# Patient Record
Sex: MALE | Race: OTHER | NOT HISPANIC OR LATINO | URBAN - METROPOLITAN AREA
[De-identification: names, ages, dates, MRNs, and addresses within clinical notes are randomized per-mention and may not be internally consistent; named-entity substitution may affect disease eponyms.]

---

## 2021-12-15 ENCOUNTER — EMERGENCY (EMERGENCY)
Facility: HOSPITAL | Age: 36
LOS: 1 days | Discharge: HOME | End: 2021-12-15
Attending: EMERGENCY MEDICINE | Admitting: EMERGENCY MEDICINE
Payer: SELF-PAY

## 2021-12-15 VITALS
WEIGHT: 149.91 LBS | RESPIRATION RATE: 18 BRPM | HEIGHT: 68 IN | SYSTOLIC BLOOD PRESSURE: 158 MMHG | DIASTOLIC BLOOD PRESSURE: 100 MMHG | TEMPERATURE: 98 F | HEART RATE: 102 BPM | OXYGEN SATURATION: 95 %

## 2021-12-15 DIAGNOSIS — F10.129 ALCOHOL ABUSE WITH INTOXICATION, UNSPECIFIED: ICD-10-CM

## 2021-12-15 DIAGNOSIS — F32.A DEPRESSION, UNSPECIFIED: ICD-10-CM

## 2021-12-15 PROCEDURE — 99284 EMERGENCY DEPT VISIT MOD MDM: CPT

## 2021-12-15 PROCEDURE — 99053 MED SERV 10PM-8AM 24 HR FAC: CPT

## 2021-12-15 PROCEDURE — 99285 EMERGENCY DEPT VISIT HI MDM: CPT

## 2021-12-15 NOTE — ED ADULT TRIAGE NOTE - ARRIVAL INFO ADDITIONAL COMMENTS
pt reports feeling depressed lately, drinking daily, drank beers a few hours ago. steady gait, denies SI

## 2021-12-15 NOTE — ED PROVIDER NOTE - CLINICAL SUMMARY MEDICAL DECISION MAKING FREE TEXT BOX
etoh use: no gross trauma, neuro intact, 528a: The patient is now awake and alert, clinically sober.  Able to walk a straight line.  Repeat exam and neuro/cranial nerve exams normal.  No evidence of head/neck trauma.  Patient denies any pain or other complaints.  Denies cp/sob/ha/abd pain.  Abd soft, lungs clear, heart exam normal.  Una po challenge.  Patient says only used alcohol no other substances.  States is taking uber home. Feels much better and pt feels safe for discharge.  No evidence of intoxication at this time or alcohol withdrawal.  No other complaints on discharge.

## 2021-12-15 NOTE — ED PROVIDER NOTE - PATIENT PORTAL LINK FT
You can access the FollowMyHealth Patient Portal offered by [facility] by registering at the following website: [email or web address]. By joining [facility]’s FollowMyHealth portal, you will also be able to view your health information using other applications (apps) compatible with our system.

## 2021-12-15 NOTE — ED PROVIDER NOTE - OBJECTIVE STATEMENT
35 yo Pt previously healthy with complaints of ams, per  pt, drank etoh tonight, denies drug use. Endorses depression but denies SI/HI, AH/VH, Denies HA, SOB/CP, URI symptoms, weakness/numbness, abd pain, urinary complaints. Denies toxic ingestions. States works from home.

## 2021-12-15 NOTE — ED ADULT NURSE NOTE - OBJECTIVE STATEMENT
36y Male A&OX3 biba. Reports drinking "about 6 beers today". Pt daily drinker. per "My fried called the ambulance I feel fine". Pt protecting airway. Equal bilateral chest rise. Endorsees feeling of depression. Denies SI/HI, nor at home medication use nor medical hx. Pt's clothing removed bagged, labeled, and stored in locker. Placed in yellow gown with yellow bracelet and red socks. Denies drug use, chest pain, dizziness, n/v/d, fever, chills, cough, nor pain

## 2021-12-15 NOTE — ED ADULT TRIAGE NOTE - WEIGHT IN LBS
Looks great and is very . RN reports pt just returned from sitting in chair. Wants liquids Vitals reviewed Lungs-clear apices CV-reg 
abd-hypoactive BS. JPs minimal, bloody, nonpurulent output 
inicision- some skin necrosis below umbilicus along incision on right Recent Results (from the past 12 hour(s)) METABOLIC PANEL, BASIC Collection Time: 04/06/19  4:41 AM  
Result Value Ref Range Sodium 139 136 - 145 mmol/L Potassium 3.4 (L) 3.5 - 5.1 mmol/L Chloride 99 98 - 107 mmol/L  
 CO2 29 21 - 32 mmol/L Anion gap 11 mmol/L Glucose 174 (H) 65 - 100 mg/dL BUN 16 6 - 23 MG/DL Creatinine 0.95 0.6 - 1.0 MG/DL  
 GFR est AA >60 >60 ml/min/1.73m2 GFR est non-AA >60 ml/min/1.73m2 Calcium 9.3 8.3 - 10.4 MG/DL MAGNESIUM Collection Time: 04/06/19  4:41 AM  
Result Value Ref Range Magnesium 1.9 1.8 - 2.4 mg/dL GLUCOSE, POC Collection Time: 04/06/19 11:56 AM  
Result Value Ref Range Glucose (POC) 197 (H) 65 - 100 mg/dL Plan- 
Transfer to floor Clamp NGT and start clears Cont empiric abx Watch fluid intake 149.9